# Patient Record
Sex: MALE | Race: WHITE | NOT HISPANIC OR LATINO | Employment: FULL TIME | URBAN - METROPOLITAN AREA
[De-identification: names, ages, dates, MRNs, and addresses within clinical notes are randomized per-mention and may not be internally consistent; named-entity substitution may affect disease eponyms.]

---

## 2022-11-03 ENCOUNTER — OFFICE VISIT (OUTPATIENT)
Dept: FAMILY MEDICINE | Facility: CLINIC | Age: 22
End: 2022-11-03
Payer: COMMERCIAL

## 2022-11-03 VITALS
SYSTOLIC BLOOD PRESSURE: 118 MMHG | HEIGHT: 69 IN | WEIGHT: 120.4 LBS | DIASTOLIC BLOOD PRESSURE: 60 MMHG | HEART RATE: 80 BPM | BODY MASS INDEX: 17.83 KG/M2 | OXYGEN SATURATION: 100 % | RESPIRATION RATE: 20 BRPM | TEMPERATURE: 98.2 F

## 2022-11-03 DIAGNOSIS — Z13.1 SCREENING FOR DIABETES MELLITUS: ICD-10-CM

## 2022-11-03 DIAGNOSIS — Z13.220 SCREENING FOR HYPERLIPIDEMIA: ICD-10-CM

## 2022-11-03 DIAGNOSIS — Z00.00 ROUTINE HISTORY AND PHYSICAL EXAMINATION OF ADULT: Primary | ICD-10-CM

## 2022-11-03 PROCEDURE — 36415 COLL VENOUS BLD VENIPUNCTURE: CPT | Performed by: NURSE PRACTITIONER

## 2022-11-03 PROCEDURE — 84443 ASSAY THYROID STIM HORMONE: CPT | Performed by: NURSE PRACTITIONER

## 2022-11-03 PROCEDURE — 80061 LIPID PANEL: CPT | Performed by: NURSE PRACTITIONER

## 2022-11-03 PROCEDURE — 99385 PREV VISIT NEW AGE 18-39: CPT | Performed by: NURSE PRACTITIONER

## 2022-11-03 PROCEDURE — 82947 ASSAY GLUCOSE BLOOD QUANT: CPT | Performed by: NURSE PRACTITIONER

## 2022-11-03 RX ORDER — MIRTAZAPINE 15 MG/1
15 TABLET, FILM COATED ORAL AT BEDTIME
COMMUNITY

## 2022-11-03 ASSESSMENT — ENCOUNTER SYMPTOMS
MYALGIAS: 0
CONSTIPATION: 0
WEAKNESS: 0
HEARTBURN: 0
DYSURIA: 0
DIZZINESS: 0
CHILLS: 0
SORE THROAT: 0
COUGH: 0
ARTHRALGIAS: 0
HEMATOCHEZIA: 0
EYE PAIN: 0
NERVOUS/ANXIOUS: 1
FREQUENCY: 1
PALPITATIONS: 0
HEMATURIA: 0
JOINT SWELLING: 0
ABDOMINAL PAIN: 0
NAUSEA: 0
HEADACHES: 0
FEVER: 0
SHORTNESS OF BREATH: 0
DIARRHEA: 0
PARESTHESIAS: 0

## 2022-11-03 ASSESSMENT — PAIN SCALES - GENERAL: PAINLEVEL: NO PAIN (0)
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
## 2022-11-04 LAB
CHOLEST SERPL-MCNC: 119 MG/DL
FASTING STATUS PATIENT QL REPORTED: YES
FASTING STATUS PATIENT QL REPORTED: YES
GLUCOSE BLD-MCNC: 103 MG/DL (ref 70–99)
HDLC SERPL-MCNC: 75 MG/DL
LDLC SERPL CALC-MCNC: 39 MG/DL
NONHDLC SERPL-MCNC: 44 MG/DL
TRIGL SERPL-MCNC: 24 MG/DL
TSH SERPL DL<=0.005 MIU/L-ACNC: 0.9 MU/L (ref 0.4–4)

## 2022-11-07 ENCOUNTER — OFFICE VISIT (OUTPATIENT)
Dept: OPHTHALMOLOGY | Facility: CLINIC | Age: 22
End: 2022-11-07
Payer: COMMERCIAL

## 2022-11-07 DIAGNOSIS — Z01.00 EXAMINATION OF EYES AND VISION: Primary | ICD-10-CM

## 2022-11-07 DIAGNOSIS — H52.13 MYOPIA OF BOTH EYES: ICD-10-CM

## 2022-11-07 PROCEDURE — 92015 DETERMINE REFRACTIVE STATE: CPT | Performed by: OPHTHALMOLOGY

## 2022-11-07 PROCEDURE — 92004 COMPRE OPH EXAM NEW PT 1/>: CPT | Performed by: OPHTHALMOLOGY

## 2022-11-07 ASSESSMENT — REFRACTION_WEARINGRX
SPECS_TYPE: SVL
OD_SPHERE: -1.00
OD_AXIS: 004
OS_SPHERE: -1.00
OS_AXIS: 004
OS_CYLINDER: +0.25
OD_CYLINDER: +0.25

## 2022-11-07 ASSESSMENT — CUP TO DISC RATIO
OD_RATIO: 0.4
OS_RATIO: 0.5

## 2022-11-07 ASSESSMENT — CONF VISUAL FIELD
OS_SUPERIOR_TEMPORAL_RESTRICTION: 0
OS_NORMAL: 1
OD_SUPERIOR_TEMPORAL_RESTRICTION: 0
OS_SUPERIOR_NASAL_RESTRICTION: 0
OS_INFERIOR_TEMPORAL_RESTRICTION: 0
OD_INFERIOR_NASAL_RESTRICTION: 0
OD_SUPERIOR_NASAL_RESTRICTION: 0
OS_INFERIOR_NASAL_RESTRICTION: 0
OD_NORMAL: 1
OD_INFERIOR_TEMPORAL_RESTRICTION: 0

## 2022-11-07 ASSESSMENT — REFRACTION_MANIFEST
OD_AXIS: 045
OS_AXIS: 150
OD_SPHERE: -0.75
OD_CYLINDER: +0.25
OS_SPHERE: -1.00
OS_CYLINDER: +0.25

## 2022-11-07 ASSESSMENT — VISUAL ACUITY
METHOD: SNELLEN - LINEAR
CORRECTION_TYPE: GLASSES
OS_CC: 20/20
OD_CC: 20/20

## 2022-11-07 ASSESSMENT — SLIT LAMP EXAM - LIDS
COMMENTS: NORMAL
COMMENTS: NORMAL

## 2022-11-07 ASSESSMENT — TONOMETRY
IOP_METHOD: APPLANATION
OS_IOP_MMHG: 18
OD_IOP_MMHG: 18

## 2022-11-07 ASSESSMENT — EXTERNAL EXAM - LEFT EYE: OS_EXAM: NORMAL

## 2022-11-07 ASSESSMENT — EXTERNAL EXAM - RIGHT EYE: OD_EXAM: NORMAL

## 2022-11-07 NOTE — PATIENT INSTRUCTIONS
"Glasses prescription given - optional  May use artificial tears up to four times a day (like Refresh Optive, Systane Balance, TheraTears, or generic artificial tears are ok. Avoid \"get the red out\" drops).   Call in July 2023/ 2024 for an appointment in November 2023/ 2024 for Complete Exam    Dr. Magdaleno [phone]    "

## 2022-11-07 NOTE — PROGRESS NOTES
" Current Eye Medications: dry eyes prn       Subjective:  Here for complete eye exam. 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 Sensitive to light, today is ok. When sun is out has to have the lenses.       Objective:  See Ophthalmology Exam.       Assessment:  Baseline eye exam in patient with myopia.      ICD-10-CM    1. Examination of eyes and vision  Z01.00       2. Myopia of both eyes  H52.13            Plan:  Glasses prescription given - optional  May use artificial tears up to four times a day (like Refresh Optive, Systane Balance, TheraTears, or generic artificial tears are ok. Avoid \"get the red out\" drops).   Call in July 2023/ 2024 for an appointment in November 2023/ 2024 for Complete Exam    Dr. Magdaleno [phone]         "

## 2022-11-07 NOTE — LETTER
"    11/7/2022         RE: Davion Villalobos  [address] [place] [address]        Dear Colleague,    Thank you for referring your patient, Davion Villalobos, to the [facility]. Please see a copy of my visit note below.     Current Eye Medications: dry eyes prn       Subjective:  Here for complete eye exam. 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 Sensitive to light, today is ok. When sun is out has to have the lenses.       Objective:  See Ophthalmology Exam.       Assessment:  Baseline eye exam in patient with myopia.      ICD-10-CM    1. Examination of eyes and vision  Z01.00       2. Myopia of both eyes  H52.13            Plan:  Glasses prescription given - optional  May use artificial tears up to four times a day (like Refresh Optive, Systane Balance, TheraTears, or generic artificial tears are ok. Avoid \"get the red out\" drops).   Call in July 2023/ 2024 for an appointment in November 2023/ 2024 for Complete Exam    Dr. Magdaleno [phone]             Again, thank you for allowing me to participate in the care of your patient.        Sincerely,        Damion Magdaleno MD    "

## 2022-11-10 ENCOUNTER — TELEPHONE (OUTPATIENT)
Dept: FAMILY MEDICINE | Facility: CLINIC | Age: 22
End: 2022-11-10

## 2022-11-10 NOTE — TELEPHONE ENCOUNTER
RN received call from patient.    Patient states he is unable to get into My Chart to see his results.    RN reviewed patients lab results with patient and recommendations.    Patient verbalized understanding.    Robbie Rodriguez RN, BSN, PHN  [facility]

## 2023-02-12 ENCOUNTER — HEALTH MAINTENANCE LETTER (OUTPATIENT)
Age: 23
End: 2023-02-12

## 2023-12-31 ENCOUNTER — HEALTH MAINTENANCE LETTER (OUTPATIENT)
Age: 23
End: 2023-12-31

## 2024-06-12 ENCOUNTER — NURSE TRIAGE (OUTPATIENT)
Dept: FAMILY MEDICINE | Facility: CLINIC | Age: 24
End: 2024-06-12

## 2024-06-12 NOTE — TELEPHONE ENCOUNTER
"Reason for Disposition    Fever present > 3 days (72 hours)    Additional Information    Negative: Difficult to awaken or acting confused (e.g., disoriented, slurred speech)    Negative: Pale cold skin and very weak (can't stand)    Negative: Difficulty breathing and bluish (or gray) lips or face    Negative: New-onset rash with purple (or blood-colored) spots or dots    Negative: Sounds like a life-threatening emergency to the triager    Negative: Headache and stiff neck (can't touch chin to chest)    Negative: Difficulty breathing    Negative: IV Drug Use (IVDU)    Negative: Fever > 103 F (39.4 C)    Negative: Fever > 100.0 F (37.8 C) and indwelling urinary catheter (e.g., Newton, coude)    Negative: Fever > 100.4 F (38.0 C) and has port (portacath), central line, or PICC line    Negative: Drinking very little and dehydration suspected (e.g., no urine > 12 hours, very dry mouth, very lightheaded)    Negative: Patient sounds very sick or weak to the triager    Negative: Fever > 101 F (38.3 C) and over 60 years of age    Negative: Fever > 100.0 F (37.8 C) and diabetes mellitus or a weak immune system (e.g., HIV positive, chemotherapy, splenectomy)    Negative: Fever > 100.0 F (37.8 C) and bedridden (e.g., CVA, chronic illness, recovering from surgery)    Negative: Fever > 100.4 F (38.0 C) and surgery in the past month    Negative: Transplant patient (e.g., liver, heart, lung, kidney)    Negative: Widespread rash and cause unknown    Negative: Severe chills (i.e., feeling extremely cold WITH shaking chills)    Negative: Fever onset within 24 hours of receiving vaccine    Negative: Fever within 14 days of COVID-19 Exposure    Negative: Pregnant    Negative: Postpartum (from 0 to 6 weeks after delivery)    Answer Assessment - Initial Assessment Questions  1. TEMPERATURE: \"What is the most recent temperature?\"  \"How was it measured?\"       100.5 about 15 min ago, oral  2. ONSET: \"When did the fever start?\"       Monday " "6/10/2024  3. CHILLS: \"Do you have chills?\" If yes: \"How bad are they?\"  (e.g., none, mild, moderate, severe)    - NONE: no chills    - MILD: feeling cold    - MODERATE: feeling very cold, some shivering (feels better under a thick blanket)    - SEVERE: feeling extremely cold with shaking chills (general body shaking, rigors; even under a thick blanket)       Yes, chills, moderate   4. OTHER SYMPTOMS: \"Do you have any other symptoms besides the fever?\"  (e.g., abdomen pain, cough, diarrhea, earache, headache, sore throat, urination pain)      Mild dry cough started today but is quite infrequent and not bothersome  5. CAUSE: If there are no symptoms, ask: \"What do you think is causing the fever?\"       Not sure   6. CONTACTS: \"Does anyone else in the family have an infection?\"      No   7. TREATMENT: \"What have you done so far to treat this fever?\" (e.g., medications)      Ibuprofen and tylenol   8. IMMUNOCOMPROMISE: \"Do you have of the following: diabetes, HIV positive, splenectomy, cancer chemotherapy, chronic steroid treatment, transplant patient, etc.\"      Denies   9. PREGNANCY: \"Is there any chance you are pregnant?\" \"When was your last menstrual period?\"      N/a   10. TRAVEL: \"Have you traveled out of the country in the last month?\" (e.g., travel history, exposures)        Denies    Protocols used: Fever-A-[place]    "

## 2024-06-13 ENCOUNTER — ANCILLARY PROCEDURE (OUTPATIENT)
Dept: GENERAL RADIOLOGY | Facility: CLINIC | Age: 24
End: 2024-06-13

## 2024-06-13 ENCOUNTER — OFFICE VISIT (OUTPATIENT)
Dept: FAMILY MEDICINE | Facility: CLINIC | Age: 24
End: 2024-06-13

## 2024-06-13 VITALS
DIASTOLIC BLOOD PRESSURE: 66 MMHG | WEIGHT: 126.4 LBS | HEART RATE: 89 BPM | TEMPERATURE: 99.4 F | SYSTOLIC BLOOD PRESSURE: 119 MMHG | BODY MASS INDEX: 18.72 KG/M2 | OXYGEN SATURATION: 99 % | HEIGHT: 69 IN | RESPIRATION RATE: 16 BRPM

## 2024-06-13 DIAGNOSIS — R50.9 FEVER, UNSPECIFIED FEVER CAUSE: ICD-10-CM

## 2024-06-13 DIAGNOSIS — M79.10 MYALGIA: ICD-10-CM

## 2024-06-13 DIAGNOSIS — M79.10 MYALGIA: Primary | ICD-10-CM

## 2024-06-13 PROBLEM — F90.2 ATTENTION DEFICIT HYPERACTIVITY DISORDER (ADHD), COMBINED TYPE: Status: ACTIVE | Noted: 2018-03-30

## 2024-06-13 LAB
BASOPHILS # BLD AUTO: 0 10E3/UL (ref 0–0.2)
BASOPHILS NFR BLD AUTO: 0 %
EOSINOPHIL # BLD AUTO: 0 10E3/UL (ref 0–0.7)
EOSINOPHIL NFR BLD AUTO: 0 %
ERYTHROCYTE [DISTWIDTH] IN BLOOD BY AUTOMATED COUNT: 13.4 % (ref 10–15)
HCT VFR BLD AUTO: 45 % (ref 40–53)
HGB BLD-MCNC: 14.4 G/DL (ref 13.3–17.7)
IMM GRANULOCYTES # BLD: 0 10E3/UL
IMM GRANULOCYTES NFR BLD: 0 %
LYMPHOCYTES # BLD AUTO: 0.9 10E3/UL (ref 0.8–5.3)
LYMPHOCYTES NFR BLD AUTO: 17 %
MCH RBC QN AUTO: 28.7 PG (ref 26.5–33)
MCHC RBC AUTO-ENTMCNC: 32 G/DL (ref 31.5–36.5)
MCV RBC AUTO: 90 FL (ref 78–100)
MONOCYTES # BLD AUTO: 0.5 10E3/UL (ref 0–1.3)
MONOCYTES NFR BLD AUTO: 10 %
NEUTROPHILS # BLD AUTO: 3.7 10E3/UL (ref 1.6–8.3)
NEUTROPHILS NFR BLD AUTO: 73 %
PLATELET # BLD AUTO: 130 10E3/UL (ref 150–450)
RBC # BLD AUTO: 5.02 10E6/UL (ref 4.4–5.9)
WBC # BLD AUTO: 5 10E3/UL (ref 4–11)

## 2024-06-13 PROCEDURE — 71046 X-RAY EXAM CHEST 2 VIEWS: CPT | Mod: TC | Performed by: RADIOLOGY

## 2024-06-13 PROCEDURE — 36415 COLL VENOUS BLD VENIPUNCTURE: CPT

## 2024-06-13 PROCEDURE — 99214 OFFICE O/P EST MOD 30 MIN: CPT

## 2024-06-13 PROCEDURE — 85025 COMPLETE CBC W/AUTO DIFF WBC: CPT

## 2024-06-13 RX ORDER — DOXYCYCLINE 100 MG/1
100 CAPSULE ORAL 2 TIMES DAILY
Qty: 20 CAPSULE | Refills: 0 | Status: SHIPPED | OUTPATIENT
Start: 2024-06-13 | End: 2024-06-23

## 2024-06-13 ASSESSMENT — ENCOUNTER SYMPTOMS: FEVER: 1

## 2024-06-13 NOTE — PROGRESS NOTES
Assessment & Plan       Myalgia  Fever, unspecified fever cause  Acute, unclear etiology, given systemic symptoms ddx includes pna and will get chest xray and cbc today, start doxycycline to cover atypical PNA or Lyme disease. Follow up if worsening, not improving.   - XR Chest 2 Views  - CBC with platelets and differential  - doxycycline hyclate (VIBRAMYCIN) 100 MG capsule  Dispense: 20 capsule; Refill: 0                See Patient Instructions    Ana Laura Ricardo is a 24 year old, presenting for the following health issues:  Fever and Musculoskeletal Problem (/)        6/13/2024     2:17 PM   Additional Questions   Roomed by Nathanael ANDERSON MA   Accompanied by Leo Florian     History of Present Illness       Back Pain:  He presents for follow up of back pain. Patient's back pain is a new problem.    Original cause of back pain: not sure  First noticed back pain: 1-4 weeks ago  Patient feels back pain: constantlyLocation of back pain:  Right lower back, left lower back, right middle of back, left middle of back, right upper back, left upper back, right side of neck, left side of neck and left shoulder  Description of back pain: sharp, shooting and stabbing  Back pain spreads: nowhere    Since patient first noticed back pain, pain is: always present, but gets better and worse  Does back pain interfere with his job:  Yes  On a scale of 1-10 (10 being the worst), patient describes pain as:  9  What makes back pain worse: bending and coughing   Acupuncture: not tried  Acetaminophen: helpful  Activity or exercise: not helpful  Chiropractor:  Not tried  Cold: not tried  Heat: not tried  Massage: helpful  Muscle relaxants: not tried  NSAIDS: helpful  Opioids: not tried  Physical Therapy: not tried  Rest: not helpful  Steroid Injection: not tried  Stretching: not helpful  Surgery: not tried  TENS unit: not tried  Topical pain relievers: not tried  Other healthcare providers patient is seeing for back pain:  "None    Headaches:   Since the patient's last clinic visit, headaches are: no change  The patient is getting headaches:  When medication wears off  He is not able to do normal daily activities when he has a migraine.  The patient is taking the following rescue/relief medications:  Ibuprofen (Advil, Motrin) and Tylenol   Patient states \"I get some relief\" from the rescue/relief medications.   The patient is taking the following medications to prevent migraines:  No medications to prevent migraines  In the past 4 weeks, the patient has gone to an Urgent Care or Emergency Room 0 times times due to headaches.    Reason for visit:  Fever muscle aches  Symptom onset:  3-7 days ago  Symptoms include:  Low back pain shoulder pain fever chills loss of appetite slight cough  Symptom intensity:  Severe  Symptom progression:  Staying the same  Had these symptoms before:  No  What makes it better:  Yes medication    He eats 0-1 servings of fruits and vegetables daily.He consumes 0 sweetened beverage(s) daily.He exercises with enough effort to increase his heart rate 60 or more minutes per day.  He exercises with enough effort to increase his heart rate 5 days per week.   He is taking medications regularly.           Acute Illness  Acute illness concerns: Fever, muscle aches  Onset/Duration: 3 days ago  Symptoms:  Fever: YES, highest 103.1, 102 after 6 hours of medication   Chills/Sweats: YES, when medication wears off   Headache (location?): YES, mild now  Sinus Pressure: No  Conjunctivitis:  No  Ear Pain: no  Rhinorrhea: No  Congestion: No  Sore Throat: No  Cough: Slight cough, dry cough  Wheeze: No  Decreased Appetite: YES, a little bit  Nausea: No  Vomiting: No  Diarrhea: YES, yesterday, back to normal today, could have been recent diet.   Dysuria/Freq.: No  Dysuria or Hematuria: No  Fatigue/Achiness: YES, both  Sick/Strep Exposure: No  Therapies tried and outcome: ibuprofen and tylenol      A little shoulder pain.   Less " "frequent stools.   Sleeping most of the day.   + bilateral hand numbness - felt cold.     NO rash.   NO urinary symptoms  NO chest pain. NO SOB. NO palpitations.         Wt Readings from Last 5 Encounters:   06/13/24 57.3 kg (126 lb 6.4 oz)   11/03/22 54.6 kg (120 lb 6.4 oz)          Review of Systems  Constitutional, HEENT, cardiovascular, pulmonary, gi and gu systems are negative, except as otherwise noted.      Objective    /66 (BP Location: Right arm, Patient Position: Chair, Cuff Size: Adult Regular)   Pulse 89   Temp 99.4  F (37.4  C) (Oral)   Resp 16   Ht 1.75 m (5' 8.9\")   Wt 57.3 kg (126 lb 6.4 oz)   SpO2 99%   BMI 18.72 kg/m    Body mass index is 18.72 kg/m .  Physical Exam   GENERAL: alert and no distress  NECK: no adenopathy, no asymmetry, masses, or scars  RESP: lungs clear to auscultation - no rales, rhonchi or wheezes  CV: regular rate and rhythm, normal S1 S2, no S3 or S4, no murmur, click or rub, no peripheral edema  ABDOMEN: soft, nontender, no hepatosplenomegaly, no masses and bowel sounds normal  MS: no gross musculoskeletal defects noted, no edema    Results for orders placed or performed in visit on 06/13/24   XR Chest 2 Views     Status: None    Narrative    CHEST TWO VIEWS  6/13/2024 4:11 PM     HISTORY:  Diminished right lung sounds. Fever. Myalgia.    COMPARISON: None.      Impression    IMPRESSION: Negative chest. Lungs clear. No pleural effusions.    YURIY AYALA MD         SYSTEM ID:  [id number]   Results for orders placed or performed in visit on 06/13/24   CBC with platelets and differential     Status: Abnormal   Result Value Ref Range    WBC Count 5.0 4.0 - 11.0 10e3/uL    RBC Count 5.02 4.40 - 5.90 10e6/uL    Hemoglobin 14.4 13.3 - 17.7 g/dL    Hematocrit 45.0 40.0 - 53.0 %    MCV 90 78 - 100 fL    MCH 28.7 26.5 - 33.0 pg    MCHC 32.0 31.5 - 36.5 g/dL    RDW 13.4 10.0 - 15.0 %    Platelet Count 130 (L) 150 - 450 10e3/uL    % Neutrophils 73 %    % Lymphocytes 17 %    % " Monocytes 10 %    % Eosinophils 0 %    % Basophils 0 %    % Immature Granulocytes 0 %    Absolute Neutrophils 3.7 1.6 - 8.3 10e3/uL    Absolute Lymphocytes 0.9 0.8 - 5.3 10e3/uL    Absolute Monocytes 0.5 0.0 - 1.3 10e3/uL    Absolute Eosinophils 0.0 0.0 - 0.7 10e3/uL    Absolute Basophils 0.0 0.0 - 0.2 10e3/uL    Absolute Immature Granulocytes 0.0 <=0.4 10e3/uL   CBC with platelets and differential     Status: Abnormal    Narrative    The following orders were created for panel order CBC with platelets and differential.  Procedure                               Abnormality         Status                     ---------                               -----------         ------                     CBC with platelets and d...[[id number]]  Abnormal            Final result                 Please view results for these tests on the individual orders.           Signed Electronically by: PHU Park CNP

## 2024-06-13 NOTE — PATIENT INSTRUCTIONS
Start doxycycline 100 mg twice daily. This would treat Lyme's AND atypical pneumonia     Xray and blood counts today - I will have nurse call or I will call if need to change your plan  Return in 1 week if not returning to normal    Schedule annual exam when you get insurance    Please see the handouts I've included at the end of your After Visit Summary on fevers, muscle aches, lyme's, pneumonia . Be sure review the section on signs or symptoms that warrant emergency or urgent medical attention.

## 2025-01-19 ENCOUNTER — HEALTH MAINTENANCE LETTER (OUTPATIENT)
Age: 25
End: 2025-01-19